# Patient Record
Sex: FEMALE | URBAN - METROPOLITAN AREA
[De-identification: names, ages, dates, MRNs, and addresses within clinical notes are randomized per-mention and may not be internally consistent; named-entity substitution may affect disease eponyms.]

---

## 2019-06-27 ENCOUNTER — APPOINTMENT (RX ONLY)
Dept: URBAN - METROPOLITAN AREA CLINIC 116 | Facility: CLINIC | Age: 69
Setting detail: DERMATOLOGY
End: 2019-06-27

## 2019-06-27 DIAGNOSIS — D485 NEOPLASM OF UNCERTAIN BEHAVIOR OF SKIN: ICD-10-CM

## 2019-06-27 PROBLEM — I10 ESSENTIAL (PRIMARY) HYPERTENSION: Status: ACTIVE | Noted: 2019-06-27

## 2019-06-27 PROBLEM — D48.5 NEOPLASM OF UNCERTAIN BEHAVIOR OF SKIN: Status: ACTIVE | Noted: 2019-06-27

## 2019-06-27 PROCEDURE — ? BIOPSY BY SHAVE METHOD

## 2019-06-27 PROCEDURE — 11102 TANGNTL BX SKIN SINGLE LES: CPT

## 2019-06-27 PROCEDURE — ? COUNSELING

## 2019-06-27 ASSESSMENT — LOCATION DETAILED DESCRIPTION DERM: LOCATION DETAILED: NASAL DORSUM

## 2019-06-27 ASSESSMENT — LOCATION SIMPLE DESCRIPTION DERM: LOCATION SIMPLE: NOSE

## 2019-06-27 ASSESSMENT — LOCATION ZONE DERM: LOCATION ZONE: NOSE

## 2019-06-27 NOTE — PROCEDURE: BIOPSY BY SHAVE METHOD
Electrodesiccation Text: The wound bed was treated with electrodesiccation after the biopsy was performed.
Post-Care Instructions: I reviewed with the patient in detail post-care instructions. Patient is to keep the biopsy site dry overnight, and then apply bacitracin twice daily until healed. Patient may apply hydrogen peroxide soaks to remove any crusting.
Biopsy Method: 15 blade
Anesthesia Type: 1% lidocaine with 1:100,000 epinephrine
Type Of Destruction Used: Curettage
Hemostasis: Aluminum Chloride
Billing Type: [employer]
Render In Bullet Format When Appropriate: No
Wound Care: Vaseline
Electrodesiccation And Curettage Text: The wound bed was treated with electrodesiccation and curettage after the biopsy was performed.
Size Of Lesion In Cm: 0.3
Consent: Written consent was obtained and risks were reviewed including but not limited to scarring, infection, bleeding, scabbing, incomplete removal, nerve damage and allergy to anesthesia.
Additional Anesthesia Volume In Cc (Will Not Render If 0): 0
Notification Instructions: Patient will be notified of biopsy results. However, patient instructed to call the office if not contacted within 2 weeks.
Silver Nitrate Text: The wound bed was treated with silver nitrate after the biopsy was performed.
Curettage Text: The wound bed was treated with curettage after the biopsy was performed.
Biopsy Type: H and E
Anesthesia Volume In Cc (Will Not Render If 0): 0.4
Depth Of Biopsy: dermis
Lab: [id number] [facility] [address]
Was A Bandage Applied: Yes
Detail Level: Simple
Cryotherapy Text: The wound bed was treated with cryotherapy after the biopsy was performed.
Body Location Override (Optional - Billing Will Still Be Based On Selected Body Map Location If Applicable): nasal tip
Dressing: pressure dressing with telfa

## 2021-11-29 NOTE — PATIENT DISCUSSION
Consider Blepharoplasty after cataract surgery.  Patient to have evaluation after cataracts surgery with specialist.

## 2021-11-29 NOTE — PATIENT DISCUSSION
PO INSTRUCTIONS: The patient was instructed in the proper use of post-operative eye drops: pred-gati-brom in the surgical eye 3 times per day for 2 weeks, then 2 times per day for 1 week, then 1 time per day for 1 week, then discontinue. Written instructions were given and reviewed with the patient. Call back instructions, retinal detachment and endophthalmitis precautions given.

## 2021-11-29 NOTE — PATIENT DISCUSSION
Discussed asymmetric astigmatism is not correctable with lasik or cataract surgery. Consider hard contact lenses after surgery. 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

## 2021-11-29 NOTE — PATIENT DISCUSSION
VISUALLY SIGNIFICANT SCHEDULE OS FIRST THEN OD: I have discussed the option of glasses versus cataract surgery versus following. It was explained that when the patients vision no longer meets their visual needs and a glasses prescription does not improve visual symptoms, the option of cataract surgery is a reasonable next step. It was explained that there is no guarantee that removing the cataract will improve their visual symptoms, however; it is believed that the cataract is contributing to the patient's visual impairment and surgery may improve both the visual and functional status of the patient. The risks, benefits and alternatives of surgery were discussed with the patient. After this discussion, the patient desires to proceed with cataract surgery with implantation of an intraocular lens to improve vision.

## 2021-11-29 NOTE — PATIENT DISCUSSION
LONG AXIAL LENGTH - REFER PATIENT TO RETINA SPECIALIST FOR CLEARANCE PRIOR TO CATARACT SURGERY. Long Axial Length/High Myopia/Lattice Degeneration Counseling: Long Axial Length, High Myopia, and Lattice Degeneration cause nearsightedness (myopia) and increase the risk of retinal detachment during cataract surgery. It is recommended that the patient have a dilated fundus exam with a retinal specialist for clearance prior to cataract surgery which may require additional treatment. It has been explained that the vision after cataract surgery may still be limited as a result of these things.

## 2022-01-17 NOTE — PATIENT DISCUSSION
Patient advised his best vision is likely with hard contact lenses. Refer to Dr. Lexus Hays for hard CL fit.

## 2022-07-09 ENCOUNTER — TELEPHONE ENCOUNTER (OUTPATIENT)
Dept: URBAN - METROPOLITAN AREA CLINIC 121 | Facility: CLINIC | Age: 72
End: 2022-07-09

## 2022-07-10 ENCOUNTER — TELEPHONE ENCOUNTER (OUTPATIENT)
Dept: URBAN - METROPOLITAN AREA CLINIC 121 | Facility: CLINIC | Age: 72
End: 2022-07-10

## 2023-04-07 ENCOUNTER — POST-OP (OUTPATIENT)
Dept: URBAN - METROPOLITAN AREA CLINIC 29 | Facility: CLINIC | Age: 73
End: 2023-04-07

## 2023-04-07 DIAGNOSIS — Z96.1: ICD-10-CM

## 2023-04-07 DIAGNOSIS — H43.812: ICD-10-CM

## 2023-04-07 ASSESSMENT — TONOMETRY
OD_IOP_MMHG: 13
OS_IOP_MMHG: 16

## 2023-04-07 ASSESSMENT — VISUAL ACUITY
OS_SC: 20/20
OD_SC: 20/20

## 2024-01-26 ENCOUNTER — ESTABLISHED PATIENT (OUTPATIENT)
Dept: URBAN - METROPOLITAN AREA CLINIC 29 | Facility: CLINIC | Age: 74
End: 2024-01-26

## 2024-01-26 DIAGNOSIS — H02.834: ICD-10-CM

## 2024-01-26 DIAGNOSIS — Z96.1: ICD-10-CM

## 2024-01-26 DIAGNOSIS — H43.813: ICD-10-CM

## 2024-01-26 DIAGNOSIS — H02.831: ICD-10-CM

## 2024-01-26 PROCEDURE — 99214 OFFICE O/P EST MOD 30 MIN: CPT

## 2024-01-26 ASSESSMENT — TONOMETRY
OD_IOP_MMHG: 14
OS_IOP_MMHG: 15

## 2024-01-26 ASSESSMENT — VISUAL ACUITY
OD_SC: 20/20
OS_SC: 20/20
OD_SC: 20/70
OS_SC: 20/70